# Patient Record
Sex: FEMALE | Race: WHITE | HISPANIC OR LATINO | Employment: FULL TIME | URBAN - METROPOLITAN AREA
[De-identification: names, ages, dates, MRNs, and addresses within clinical notes are randomized per-mention and may not be internally consistent; named-entity substitution may affect disease eponyms.]

---

## 2017-02-16 ENCOUNTER — TRANSFERRED RECORDS (OUTPATIENT)
Dept: HEALTH INFORMATION MANAGEMENT | Facility: CLINIC | Age: 37
End: 2017-02-16

## 2017-02-21 ENCOUNTER — TRANSFERRED RECORDS (OUTPATIENT)
Dept: HEALTH INFORMATION MANAGEMENT | Facility: CLINIC | Age: 37
End: 2017-02-21

## 2017-02-28 ENCOUNTER — TRANSFERRED RECORDS (OUTPATIENT)
Dept: HEALTH INFORMATION MANAGEMENT | Facility: CLINIC | Age: 37
End: 2017-02-28

## 2017-03-07 ENCOUNTER — TRANSFERRED RECORDS (OUTPATIENT)
Dept: HEALTH INFORMATION MANAGEMENT | Facility: CLINIC | Age: 37
End: 2017-03-07

## 2017-03-14 ENCOUNTER — TRANSFERRED RECORDS (OUTPATIENT)
Dept: HEALTH INFORMATION MANAGEMENT | Facility: CLINIC | Age: 37
End: 2017-03-14

## 2017-03-21 ENCOUNTER — TRANSFERRED RECORDS (OUTPATIENT)
Dept: HEALTH INFORMATION MANAGEMENT | Facility: CLINIC | Age: 37
End: 2017-03-21

## 2017-08-01 ENCOUNTER — TRANSFERRED RECORDS (OUTPATIENT)
Dept: HEALTH INFORMATION MANAGEMENT | Facility: CLINIC | Age: 37
End: 2017-08-01

## 2017-10-24 ENCOUNTER — TRANSFERRED RECORDS (OUTPATIENT)
Dept: HEALTH INFORMATION MANAGEMENT | Facility: CLINIC | Age: 37
End: 2017-10-24

## 2017-11-02 ENCOUNTER — PRE VISIT (OUTPATIENT)
Dept: NEUROLOGY | Facility: CLINIC | Age: 37
End: 2017-11-02

## 2017-11-02 RX ORDER — MELATONIN 10 MG
CAPSULE ORAL
COMMUNITY

## 2017-11-02 RX ORDER — SACCHAROMYCES BOULARDII 250 MG
250 CAPSULE ORAL 2 TIMES DAILY
COMMUNITY

## 2017-11-02 RX ORDER — FOLIC ACID 0.8 MG
2 TABLET ORAL DAILY
COMMUNITY

## 2017-11-02 RX ORDER — TURMERIC 95 %
POWDER (GRAM) MISCELLANEOUS
COMMUNITY

## 2017-11-02 NOTE — TELEPHONE ENCOUNTER
Left message with detailed instructions requesting pt call the clinic back to complete previsit call. Soni Berry RN

## 2017-11-02 NOTE — TELEPHONE ENCOUNTER
PREVISIT INFORMATION                                                    Edel Kramer scheduled for future visit at [facility] specialty clinics.    Patient is scheduled to see Dr. 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 Lots of tightness in neck/collarbone and shoulder, coldness/numbness in hands correlates with pain/tightness in neck/collarbone/shoulder. Lots of EMG's, all negative. Hoping to get some answers. Wondering about bulging disks or thoracic outlet syndrome.   Referring provider Dr. Kulkarni from [facility]  Has patient seen previous specialist? Yes  Medical Records:  Received.     REVIEW                                                      New patient packet mailed to patient: Yes  Medication reconciliation complete: Yes      Current Outpatient Prescriptions   Medication Sig Dispense Refill     ZOLMitriptan (ZOMIG PO) Spray 5 mg in nostril as needed for migraine       Nutritional Supplements (NUTRITIONAL SUPPLEMENT PO) Take 2 tablets by mouth 2 times daily       Probiotic Product (PROBIOTIC ADVANCED PO)        saccharomyces boulardii (FLORASTOR) 250 MG capsule Take 250 mg by mouth 2 times daily       L-GLUTAMINE PO        milk thistle extract 140 MG CAPS capsule        Ferrous Sulfate (IRON SUPPLEMENT PO)        Vitamin D-Vitamin K (VITAMIN K2-VITAMIN D3 PO) Take 10,000 Units by mouth       Multiple Vitamins-Minerals (MULTIVITAMIN ADULT PO)        Magnesium 500 MG CAPS Take 2 capsules by mouth daily       Melatonin 10 MG CAPS        Theanine 50 MG TBDP        Turmeric, Curcuma Longa, (CURCUMIN EXTRACT) POWD        Omega-3 Fatty Acids (OMEGA 3 PO)        Topiramate (TOPAMAX PO) Take 150 mg by mouth daily        Cabergoline (DOSTINEX PO) Take 0.05 mg by mouth twice a week        Sertraline HCl (ZOLOFT PO) Take 50 mg by mouth daily.       Amphetamine-Dextroamphetamine (ADDERALL PO) Take 20 mg by mouth 2  times daily.       SUMAtriptan (IMITREX) 100 MG tablet Take 100 mg by mouth at onset of headache.         Allergies: Review of patient's allergies indicates no known allergies.        PLAN/FOLLOW-UP NEEDED                                                      Previsit review complete.  Patient will see provider at future scheduled appointment.     Patient Reminders Given:  Please, make sure you bring an updated list of your medications.   If you are having a procedure, please, present 15 minutes early.  If you need to cancel or reschedule,please call [phone].    Soni Berry

## 2017-11-08 ENCOUNTER — OFFICE VISIT (OUTPATIENT)
Dept: NEUROLOGY | Facility: CLINIC | Age: 37
End: 2017-11-08
Payer: COMMERCIAL

## 2017-11-08 VITALS
OXYGEN SATURATION: 100 % | WEIGHT: 143.8 LBS | HEIGHT: 63 IN | HEART RATE: 82 BPM | TEMPERATURE: 97.9 F | BODY MASS INDEX: 25.48 KG/M2

## 2017-11-08 DIAGNOSIS — M54.2 CERVICALGIA: Primary | ICD-10-CM

## 2017-11-08 PROCEDURE — 99204 OFFICE O/P NEW MOD 45 MIN: CPT | Performed by: PSYCHIATRY & NEUROLOGY

## 2017-11-08 NOTE — NURSING NOTE
"Edel Kramer's goals for this visit include: consult  She requests these members of her care team be copied on today's visit information:     PCP: Leona Kulkarni    Referring Provider:  DO YULIYA Gal[facility] [place]  [address] [place] [address]    Chief Complaint   Patient presents with     Consult     bilateral hand nubness and cramping       Initial Pulse 82  Temp 97.9  F (36.6  C) (Oral)  Ht 1.6 m (5' 3\")  Wt 65.2 kg (143 lb 12.8 oz)  SpO2 100%  BMI 25.47 kg/m2 Estimated body mass index is 25.47 kg/(m^2) as calculated from the following:    Height as of this encounter: 1.6 m (5' 3\").    Weight as of this encounter: 65.2 kg (143 lb 12.8 oz).  Medication Reconciliation: complete    "

## 2017-11-08 NOTE — PROGRESS NOTES
HISTORY OF PRESENT ILLNESS:  Edel Kramer is a 37-year-old right-handed woman seen for evaluation of symptoms of cold hands and numb hands.  The hands are also painful.  She has a complicated history.  She says the symptoms are worse with increased use.  She is currently working in an office as a .  She has been there for the last 3-4 months.  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  If she uses the mouse or keyboard her hands will start to bother her.  If she stops activities involving her hands then the symptoms improve.  She wonders if the symptoms are coming from her neck.  If her left trapezius as tight she will have a pulling sensation in the left arm and hand and the left arm and hand will be more numb.  More recently she feels a pressure and a tightness above and below the collarbone bilaterally.  This is not present on a daily basis.  With the use of her hand, her symptoms will be numbness and a cold sensation.  She will then get shooting pains and cramping in the fingers.  It is not clear if her hand turns color such as white or blue.  She says on the ulnar aspect of the right hand it turns a slight color of blue and sometimes her hands get pale but I do not get a history that the hands actually turn colors.  When the symptoms are bad she will become anxious and have to shake her hands out.  They will be hypersensitive to touch.  If she tries to read a book her hand will go numb and she will have to keep going back and forth between the right and the left.  She has a vague tightness in her neck.  She is not exercising on a regular basis.  She was involved in physical therapy and that was helpful earlier this year but once she was discharged from therapy she did not continue with any of the exercises.  The therapy was mostly for her neck and low back.  She used to exercise on a regular basis doing roller blade, weights and ellipse but she thinks she over does it sometimes and that  makes the symptoms worse.  She has tried gabapentin but that caused side effects.  Amitriptyline was a treatment she took for migraine.  She does not have issues with regard to vision, hearing, speech or swallowing.  She does not have problems with bowel or bladder control.  She does not have symptoms in her legs.  Her sleep is fairly good.      PAST MEDICAL HISTORY:  Benign pituitary tumor.  She does not have high blood pressure, diabetes, thyroid or asthma.  She is not pregnant.  She is currently not drinking.  She does not smoke.  She has a history of a closed head injury 2 years ago.  She fainted in the bathroom and fell and hit her head and was unconscious briefly.      SOCIAL HISTORY:  She is unmarried without children.      FAMILY HISTORY:  Noncontributory.      PHYSICAL EXAMINATION:   GENERAL:  The patient is cooperative and in no distress.   VITAL SIGNS:  Her blood pressure is 100/65.   NECK:  There are no carotid bruits.   HEART:  Auscultation of the heart shows S1, S2, without murmur, rub or gallop.   CHEST:  Clear to auscultation.   MUSCULOSKELETAL:  She has fairly good cervical range of motion.   MENTAL STATUS:  Neurologic exam, the patient is alert, oriented and lucid.   CRANIAL NERVES:  Nerve testing shows full visual fields to confrontation.  Funduscopic exam shows sharp discs bilaterally.  Visual acuity is 20/20 bilaterally.  Eye movements are complete and conjugate without nystagmus.  Pupils react to light.  Facies are symmetric, and tongue protrudes in the midline.   MOTOR:  Evaluation shows no pronator drift, normal finger tapping, finger-nose-finger and heel-knee-shin.  There is no tenderness in the ulnar groove.  There is a vague Tinel sign at the right wrist.  Strength is well preserved in the arms, hands, legs and feet.  Muscle stretch reflexes are normal, reduced and symmetric.  Finger flexor reflex is present.  Toes are downgoing.   SENSORY:  Exam shows preserved vibration, temperature and  pinprick including testing in the hands.   GAIT, STATION AND COORDINATION:  Romberg sign is absent.  She can walk on her heels, toes and tandem.      She has had extensive testing for these symptoms.  She had an EMG testing in  and at the [facility] in 2016.  Both studies were normal.  In , she had MRI of the brachial plexus, cervical spine and thoracic spine.  There was a minor disk bulge at C5-C6 but no compromise of neural structures.      ASSESSMENT:     1.  Sensory disturbance and pain in the hands, arms.   2.  Cervicalgia.      DISCUSSION:  The patient is seen with the above problem list.  Her main symptoms have to do with her hands.  The etiology of her symptoms is not clear to me.  Her exam is normal. I doubt that repeat imaging and electrodiagnostic testing will be illuminating. There are some items in the history that suggest she may have Raynaud's syndrome, although the history is somewhat vague on that point.  I suggested that she wear fingerless gloves at work to keep her hands from getting cold.  For the symptoms of cervicalgia I am going to refer her back to [facility] for another course of therapy.  I told her that one of the important points on therapy is that she has to continue the exercises once she is released from the care of the physical therapist.  I will see her in followup on an as needed basis.         BUDDY BENSON MD             D: 2017 09:26   T: 2017 11:12   MT: [id number]      Name:     SKIP GANNON   MRN:      [id number]        Account:      [id number]   :      1980           Visit Date:   2017      Document: [id number]

## 2017-11-08 NOTE — LETTER
11/8/2017        RE: Edel Kramer  [address] [facility] [address]        HISTORY OF PRESENT ILLNESS:  Edel Kramer is a 37-year-old right-handed woman seen for evaluation of symptoms of cold hands and numb hands.  The hands are also painful.  She has a complicated history.  She says the symptoms are worse with increased use.  She is currently working in an office as a .  She has been there for the last 3-4 months.  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  If she uses the mouse or keyboard her hands will start to bother her.  If she stops activities involving her hands then the symptoms improve.  She wonders if the symptoms are coming from her neck.  If her left trapezius as tight she will have a pulling sensation in the left arm and hand and the left arm and hand will be more numb.  More recently she feels a pressure and a tightness above and below the collarbone bilaterally.  This is not present on a daily basis.  With the use of her hand, her symptoms will be numbness and a cold sensation.  She will then get shooting pains and cramping in the fingers.  It is not clear if her hand turns color such as white or blue.  She says on the ulnar aspect of the right hand it turns a slight color of blue and sometimes her hands get pale but I do not get a history that the hands actually turn colors.  When the symptoms are bad she will become anxious and have to shake her hands out.  They will be hypersensitive to touch.  If she tries to read a book her hand will go numb and she will have to keep going back and forth between the right and the left.  She has a vague tightness in her neck.  She is not exercising on a regular basis.  She was involved in physical therapy and that was helpful earlier this year but once she was discharged from therapy she did not continue with any of the exercises.  The therapy was mostly for her neck and low back.  She used to exercise on a  regular basis doing roller blade, weights and ellipse but she thinks she over does it sometimes and that makes the symptoms worse.  She has tried gabapentin but that caused side effects.  Amitriptyline was a treatment she took for migraine.  She does not have issues with regard to vision, hearing, speech or swallowing.  She does not have problems with bowel or bladder control.  She does not have symptoms in her legs.  Her sleep is fairly good.      PAST MEDICAL HISTORY:  Benign pituitary tumor.  She does not have high blood pressure, diabetes, thyroid or asthma.  She is not pregnant.  She is currently not drinking.  She does not smoke.  She has a history of a closed head injury 2 years ago.  She fainted in the bathroom and fell and hit her head and was unconscious briefly.      SOCIAL HISTORY:  She is unmarried without children.      FAMILY HISTORY:  Noncontributory.      PHYSICAL EXAMINATION:   GENERAL:  The patient is cooperative and in no distress.   VITAL SIGNS:  Her blood pressure is 100/65.   NECK:  There are no carotid bruits.   HEART:  Auscultation of the heart shows S1, S2, without murmur, rub or gallop.   CHEST:  Clear to auscultation.   MUSCULOSKELETAL:  She has fairly good cervical range of motion.   MENTAL STATUS:  Neurologic exam, the patient is alert, oriented and lucid.   CRANIAL NERVES:  Nerve testing shows full visual fields to confrontation.  Funduscopic exam shows sharp discs bilaterally.  Visual acuity is 20/20 bilaterally.  Eye movements are complete and conjugate without nystagmus.  Pupils react to light.  Facies are symmetric, and tongue protrudes in the midline.   MOTOR:  Evaluation shows no pronator drift, normal finger tapping, finger-nose-finger and heel-knee-shin.  There is no tenderness in the ulnar groove.  There is a vague Tinel sign at the right wrist.  Strength is well preserved in the arms, hands, legs and feet.  Muscle stretch reflexes are normal, reduced and symmetric.  Finger  flexor reflex is present.  Toes are downgoing.   SENSORY:  Exam shows preserved vibration, temperature and pinprick including testing in the hands.   GAIT, STATION AND COORDINATION:  Romberg sign is absent.  She can walk on her heels, toes and tandem.      She has had extensive testing for these symptoms.  She had an EMG testing in  and at the [facility] in 2016.  Both studies were normal.  In , she had MRI of the brachial plexus, cervical spine and thoracic spine.  There was a minor disk bulge at C5-C6 but no compromise of neural structures.      ASSESSMENT:     1.  Sensory disturbance and pain in the hands, arms.   2.  Cervicalgia.      DISCUSSION:  The patient is seen with the above problem list.  Her main symptoms have to do with her hands.  The etiology of her symptoms is not clear to me.  Her exam is normal. I doubt that repeat imaging and electrodiagnostic testing will be illuminating. There are some items in the history that suggest she may have Raynaud's syndrome, although the history is somewhat vague on that point.  I suggested that she wear fingerless gloves at work to keep her hands from getting cold.  For the symptoms of cervicalgia I am going to refer her back to [facility] for another course of therapy.  I told her that one of the important points on therapy is that she has to continue the exercises once she is released from the care of the physical therapist.  I will see her in followup on an as needed basis.         BUDDY BENSON MD             D: 2017 09:26   T: 2017 11:12   MT: [id number]      Name:     SKIP GANNON   MRN:      [id number]        Account:      [id number]   :      1980           Visit Date:   2017      Document: [id number]          Sincerely,        Buddy Benson MD

## 2018-01-25 ENCOUNTER — HOSPITAL ENCOUNTER (EMERGENCY)
Facility: CLINIC | Age: 38
Discharge: HOME | End: 2018-01-25
Attending: EMERGENCY MEDICINE | Admitting: EMERGENCY MEDICINE
Payer: COMMERCIAL

## 2018-01-25 VITALS
RESPIRATION RATE: 16 BRPM | TEMPERATURE: 97.9 F | OXYGEN SATURATION: 100 % | DIASTOLIC BLOOD PRESSURE: 68 MMHG | HEIGHT: 63 IN | BODY MASS INDEX: 24.8 KG/M2 | WEIGHT: 140 LBS | SYSTOLIC BLOOD PRESSURE: 120 MMHG | HEART RATE: 78 BPM
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
## 2018-01-25 DIAGNOSIS — M79.642 PAIN OF LEFT HAND: ICD-10-CM

## 2018-01-25 DIAGNOSIS — M79.645 THUMB PAIN, LEFT: ICD-10-CM

## 2018-01-25 PROCEDURE — 99282 EMERGENCY DEPT VISIT SF MDM: CPT

## 2018-01-25 ASSESSMENT — ENCOUNTER SYMPTOMS
NUMBNESS: 1
FEVER: 0

## 2018-01-25 NOTE — ED AVS SNAPSHOT
Emergency Department    [address] [place] [id number]    Phone:  [phone]    Fax:  [phone]                                       Edel Kramer   MRN: [id number]    Department:   Emergency Department   Date of Visit:  1/25/2018           After Visit Summary Signature Page     I have received my discharge instructions, and my questions have been answered. I have discussed any challenges I see with this plan with the nurse or doctor.    ..........................................................................................................................................  Patient/Patient Representative Signature      ..........................................................................................................................................  Patient Representative Print Name and Relationship to Patient    ..................................................               ................................................  Date                                            Time    ..........................................................................................................................................  Reviewed by Signature/Title    ...................................................              ..............................................  Date                                                            Time

## 2018-01-25 NOTE — ED AVS SNAPSHOT
Emergency Department    [address] [place] [address]    Phone:  [phone]    Fax:  [phone]                                       Edel Kramer   MRN: [id number]    Department:   Emergency Department   Date of Visit:  1/25/2018           Patient Information     Date Of Birth          1980        Your diagnoses for this visit were:     Pain of left hand     Thumb pain, left     Dog bite, initial encounter        You were seen by Ovi Shepherd MD.      Follow-up Information     Please follow up.    Why:  follow up with hand surgeon tomorrow        Discharge Instructions         Animal Bite (General)  An animal bite can cause a wound deep enough to break the skin. In such cases, the wound is cleaned and then sometimes closed. If the wound is closed, it may not be closed completely. This is so that fluid can drain if the wound becomes infected. In addition to wound care, a tetanus shot may be given, if needed.    Home care    Care for the wound as directed. If a dressing was applied to the wound, be sure to change it as directed.    Wash your hands well with soap and warm water before and after caring for the wound. This helps lower the risk of infection.    If the wound bleeds, place a clean, soft cloth on the wound. Then firmly apply pressure until the bleeding stops. This may take up to 5 minutes. Do not release the pressure and look at the wound during this time.    Most skin wounds heal within 10 days. But an infection can occur even with proper treatment. So be sure to watch the wound for signs of infection (see below). Check the wound as often as directed by your healthcare provider.    Antibiotics may be prescribed. These help prevent or treat infection. If you re given antibiotics, take them as directed. Also be sure to complete the medicines.  Rabies prevention  Rabies is a virus that can be carried in certain animals. These can include domestic animals such as  dogs and cats. Wild animals such as skunks, raccoons, foxes, and bats can also carry rabies. Pets fully vaccinated against rabies (2 shots) are at very low risk of infection. But because human rabies is almost always fatal, any biting pet should be confined for 10 days as an extra precaution. In general, if there is a risk for rabies, the following steps may need to be taken:    If someone s pet dog or cat has bitten you, it should be kept in a secure area for the next 10 days to watch for signs of illness. (If the pet owner won t allow this, contact your local animal control center.) If the dog or cat becomes ill or dies during that time, contact your local animal control center at once so the animal may be tested for rabies. If the pet stays healthy for the next 10 days, there is no danger of rabies in the animal or you.    If a stray pet bit you, contact your local animal control center. They can give information on capture, quarantine, and animal rabies testing.    If you can t find the animal that bit you in the next 2 days, and if rabies exists in your region, you may need to receive the rabies vaccine series. Call your healthcare provider right away. Or return to the emergency department promptly.    All animal bites should be reported to the local animal control center. If you were not given a form to fill out, you can report this yourself.  Follow-up care  Follow up with your healthcare provider, or as directed.  When to seek medical advice  Call your healthcare provider right away if any of these occur:    Signs of infection:    Spreading redness or warmth from the wound    Increased pain or swelling    Fever of 100.4 F (38 C) or higher, or as directed by your healthcare provider    Colored fluid or pus draining from the wound    Signs of rabies infection:    Headache    Confusion    Strange behavior    Increased salivating and drooling    Seizure    Decreased ability to move any body part near the bite  area    Bleeding that can't be stopped after 5 minutes of firm pressure  Date Last Reviewed: 3/1/2017    9078-7780 [employer]. [address] [place] [address], [place] [address]. All rights reserved. This information is not intended as a substitute for professional medical care. Always follow your healthcare professional's instructions.          24 Hour Appointment Hotline       To make an appointment at any [facility], call [phone] ([phone]). If you don't have a family doctor or clinic, we will help you find one. [facility] are conveniently located to serve the needs of you and your family.             Review of your medicines      Our records show that you are taking the medicines listed below. If these are incorrect, please call your family doctor or clinic.        Dose / Directions Last dose taken    ADDERALL PO   Dose:  20 mg        Take 20 mg by mouth 2 times daily.   Refills:  0        Curcumin Extract Powd        Refills:  0        DOSTINEX PO   Dose:  0.05 mg        Take 0.05 mg by mouth twice a week   Refills:  0        IMITREX 100 MG tablet   Dose:  100 mg   Generic drug:  SUMAtriptan        Take 100 mg by mouth at onset of headache.   Refills:  0        IRON SUPPLEMENT PO        Refills:  0        L-GLUTAMINE PO        Refills:  0        Magnesium 500 MG Caps   Dose:  2 capsule        Take 2 capsules by mouth daily   Refills:  0        Melatonin 10 MG Caps        Refills:  0        milk thistle extract 140 MG Caps capsule        Refills:  0        MULTIVITAMIN ADULT PO        Refills:  0        NUTRITIONAL SUPPLEMENT PO   Dose:  2 tablet   Indication:  candibactin AR/BR        Take 2 tablets by mouth 2 times daily   Refills:  0        OMEGA 3 PO        Refills:  0        PROBIOTIC ADVANCED PO        Refills:  0        saccharomyces boulardii 250 MG capsule   Commonly known as:  FLORASTOR   Dose:  250 mg        Take 250 mg by mouth 2 times daily   Refills:  0        Theanine  50 MG Tbdp        Refills:  0        TOPAMAX PO   Dose:  150 mg        Take 150 mg by mouth daily   Refills:  0        VITAMIN K2-VITAMIN D3 PO   Dose:  09683 Units        Take 10,000 Units by mouth   Refills:  0        ZOLOFT PO   Dose:  50 mg        Take 50 mg by mouth daily.   Refills:  0        ZOMIG PO   Dose:  5 mg        Spray 5 mg in nostril as needed for migraine   Refills:  0                Orders Needing Specimen Collection     None      Pending Results     No orders found from 1/23/2018 to 1/26/2018.            Pending Culture Results     No orders found from 1/23/2018 to 1/26/2018.            Pending Results Instructions     If you had any lab results that were not finalized at the time of your Discharge, you can call the ED Lab Result RN at [phone]. You will be contacted by this team for any positive Lab results or changes in treatment. The nurses are available 7 days a week from 10A to 6:30P.  You can leave a message 24 hours per day and they will return your call.        Test Results From Your Hospital Stay               Clinical Quality Measure: Blood Pressure Screening     Your blood pressure was checked while you were in the emergency department today. The last reading we obtained was  BP: 120/68 . Please read the guidelines below about what these numbers mean and what you should do about them.  If your systolic blood pressure (the top number) is less than 120 and your diastolic blood pressure (the bottom number) is less than 80, then your blood pressure is normal. There is nothing more that you need to do about it.  If your systolic blood pressure (the top number) is 120-139 or your diastolic blood pressure (the bottom number) is 80-89, your blood pressure may be higher than it should be. You should have your blood pressure rechecked within a year by a primary care provider.  If your systolic blood pressure (the top number) is 140 or greater or your diastolic blood pressure (the bottom  "number) is 90 or greater, you may have high blood pressure. High blood pressure is treatable, but if left untreated over time it can put you at risk for heart attack, stroke, or kidney failure. You should have your blood pressure rechecked by a primary care provider within the next 4 weeks.  If your provider in the emergency department today gave you specific instructions to follow-up with your doctor or provider even sooner than that, you should follow that instruction and not wait for up to 4 weeks for your follow-up visit.        Thank you for choosing [facility]       Thank you for choosing [facility] for your care. Our goal is always to provide you with excellent care. Hearing back from our patients is one way we can continue to improve our services. Please take a few minutes to complete the written survey that you may receive in the mail after you visit with us. Thank you!        [facility] [employer] Information     [employer] lets you send messages to your doctor, view your test results, renew your prescriptions, schedule appointments and more. To sign up, go to [email or web address] . Click on \"Log in\" on the left side of the screen, which will take you to the Welcome page. Then click on \"Sign up Now\" on the right side of the page.     You will be asked to enter the access code listed below, as well as some personal information. Please follow the directions to create your username and password.     Your access code is: [email or web address]  Expires: 2018  9:15 AM     Your access code will  in 90 days. If you need help or a new code, please call your [facility] clinic or [phone].        Care EveryWhere ID     This is your Care EveryWhere ID. This could be used by other organizations to access your [facility] medical records  [id number]        Equal Access to Services     SARAHY LÓPEZ : lele Dodge qaybta kaalmada adeegyada, waxay idiin hayaan adeeg kharash la'aan ah. So moe " [phone].    ATENCIÓN: Si habla español, tiene a carrero disposición servicios gratuitos de asistencia lingüística. Llame al [phone].    We comply with applicable federal civil rights laws and [place] laws. We do not discriminate on the basis of race, color, national origin, age, disability, sex, sexual orientation, or gender identity.            After Visit Summary       This is your record. Keep this with you and show to your community pharmacist(s) and doctor(s) at your next visit.
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
## 2020-04-03 ENCOUNTER — VIRTUAL VISIT (OUTPATIENT)
Dept: INTERNAL MEDICINE | Facility: CLINIC | Age: 40
End: 2020-04-03
Payer: COMMERCIAL

## 2020-04-03 DIAGNOSIS — M25.512 CHRONIC PAIN OF BOTH SHOULDERS: ICD-10-CM

## 2020-04-03 DIAGNOSIS — G89.29 CHRONIC PAIN OF BOTH SHOULDERS: ICD-10-CM

## 2020-04-03 DIAGNOSIS — M25.511 CHRONIC PAIN OF BOTH SHOULDERS: ICD-10-CM

## 2020-04-03 DIAGNOSIS — M25.512 ACUTE PAIN OF LEFT SHOULDER: Primary | ICD-10-CM

## 2020-04-03 PROCEDURE — 99204 OFFICE O/P NEW MOD 45 MIN: CPT | Mod: TEL | Performed by: INTERNAL MEDICINE

## 2020-04-03 RX ORDER — METHOCARBAMOL 500 MG/1
500 TABLET, FILM COATED ORAL 3 TIMES DAILY PRN
COMMUNITY
Start: 2020-03-31 | End: 2020-04-03

## 2020-04-03 RX ORDER — METHOCARBAMOL 500 MG/1
500 TABLET, FILM COATED ORAL 3 TIMES DAILY PRN
Qty: 30 TABLET | Refills: 0 | Status: SHIPPED | OUTPATIENT
Start: 2020-04-03 | End: 2020-04-13

## 2020-04-03 RX ORDER — OXYCODONE AND ACETAMINOPHEN 5; 325 MG/1; MG/1
1 TABLET ORAL EVERY 12 HOURS PRN
Qty: 20 TABLET | Refills: 0 | Status: SHIPPED | OUTPATIENT
Start: 2020-04-03 | End: 2020-04-13

## 2020-04-03 RX ORDER — OXYCODONE AND ACETAMINOPHEN 5; 325 MG/1; MG/1
1 TABLET ORAL EVERY 4 HOURS PRN
COMMUNITY
Start: 2020-03-31 | End: 2020-04-03

## 2020-04-03 ASSESSMENT — ENCOUNTER SYMPTOMS
ARTHRALGIAS: 1
WEAKNESS: 1
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
## 2020-04-03 NOTE — NURSING NOTE
"Chief Complaint   Patient presents with     Shoulder     pt was seen in ED and was told to get MRI..pt c/o left shoulder pain and also had several injuries to it as well.     initial There were no vitals taken for this visit. Estimated body mass index is 24.8 kg/m  as calculated from the following:    Height as of 1/25/18: 1.6 m (5' 3\").    Weight as of 1/25/18: 63.5 kg (140 lb)..  bp completed using cuff size NA (Not Taken)  FIDELINA MEDINA LPN  "

## 2020-11-13 ENCOUNTER — APPOINTMENT (OUTPATIENT)
Dept: CT IMAGING | Facility: CLINIC | Age: 40
End: 2020-11-13
Attending: PHYSICIAN ASSISTANT
Payer: COMMERCIAL

## 2020-11-13 ENCOUNTER — APPOINTMENT (OUTPATIENT)
Dept: ULTRASOUND IMAGING | Facility: CLINIC | Age: 40
End: 2020-11-13
Attending: PHYSICIAN ASSISTANT
Payer: COMMERCIAL

## 2020-11-13 ENCOUNTER — HOSPITAL ENCOUNTER (EMERGENCY)
Facility: CLINIC | Age: 40
Discharge: HOME | End: 2020-11-13
Attending: PHYSICIAN ASSISTANT | Admitting: PHYSICIAN ASSISTANT
Payer: COMMERCIAL

## 2020-11-13 ENCOUNTER — NURSE TRIAGE (OUTPATIENT)
Dept: NURSING | Facility: CLINIC | Age: 40
End: 2020-11-13

## 2020-11-13 VITALS
OXYGEN SATURATION: 100 % | SYSTOLIC BLOOD PRESSURE: 111 MMHG | RESPIRATION RATE: 16 BRPM | WEIGHT: 155 LBS | HEART RATE: 68 BPM | BODY MASS INDEX: 27.46 KG/M2 | DIASTOLIC BLOOD PRESSURE: 61 MMHG

## 2020-11-13 DIAGNOSIS — K92.1 HEMATOCHEZIA: ICD-10-CM

## 2020-11-13 DIAGNOSIS — R10.84 ABDOMINAL PAIN, GENERALIZED: ICD-10-CM

## 2020-11-13 DIAGNOSIS — R19.7 DIARRHEA: ICD-10-CM

## 2020-11-13 DIAGNOSIS — K52.9 COLITIS: ICD-10-CM

## 2020-11-13 LAB
ALBUMIN SERPL-MCNC: 3.4 G/DL (ref 3.4–5)
ALBUMIN UR-MCNC: NEGATIVE MG/DL
ALP SERPL-CCNC: 78 U/L (ref 40–150)
ALT SERPL W P-5'-P-CCNC: 22 U/L (ref 0–50)
ANION GAP SERPL CALCULATED.3IONS-SCNC: 8 MMOL/L (ref 3–14)
APPEARANCE UR: CLEAR
AST SERPL W P-5'-P-CCNC: 16 U/L (ref 0–45)
BASOPHILS # BLD AUTO: 0 10E9/L (ref 0–0.2)
BASOPHILS NFR BLD AUTO: 0.4 %
BILIRUB SERPL-MCNC: 0.2 MG/DL (ref 0.2–1.3)
BILIRUB UR QL STRIP: NEGATIVE
BUN SERPL-MCNC: 11 MG/DL (ref 7–30)
CALCIUM SERPL-MCNC: 8.4 MG/DL (ref 8.5–10.1)
CHLORIDE SERPL-SCNC: 106 MMOL/L (ref 94–109)
CO2 SERPL-SCNC: 22 MMOL/L (ref 20–32)
COLOR UR AUTO: YELLOW
CREAT SERPL-MCNC: 0.81 MG/DL (ref 0.52–1.04)
DIFFERENTIAL METHOD BLD: ABNORMAL
EOSINOPHIL # BLD AUTO: 0 10E9/L (ref 0–0.7)
EOSINOPHIL NFR BLD AUTO: 0.8 %
ERYTHROCYTE [DISTWIDTH] IN BLOOD BY AUTOMATED COUNT: 15.8 % (ref 10–15)
GFR SERPL CREATININE-BSD FRML MDRD: >90 ML/MIN/{1.73_M2}
GLUCOSE SERPL-MCNC: 99 MG/DL (ref 70–99)
GLUCOSE UR STRIP-MCNC: NEGATIVE MG/DL
HCG UR QL: NEGATIVE
HCT VFR BLD AUTO: 38.8 % (ref 35–47)
HGB BLD-MCNC: 11.9 G/DL (ref 11.7–15.7)
HGB UR QL STRIP: NEGATIVE
IMM GRANULOCYTES # BLD: 0 10E9/L (ref 0–0.4)
IMM GRANULOCYTES NFR BLD: 0.4 %
KETONES UR STRIP-MCNC: >150 MG/DL
LACTATE BLD-SCNC: 0.7 MMOL/L (ref 0.7–2)
LEUKOCYTE ESTERASE UR QL STRIP: NEGATIVE
LIPASE SERPL-CCNC: 126 U/L (ref 73–393)
LYMPHOCYTES # BLD AUTO: 1.3 10E9/L (ref 0.8–5.3)
LYMPHOCYTES NFR BLD AUTO: 24.8 %
MCH RBC QN AUTO: 26.8 PG (ref 26.5–33)
MCHC RBC AUTO-ENTMCNC: 30.7 G/DL (ref 31.5–36.5)
MCV RBC AUTO: 87 FL (ref 78–100)
MONOCYTES # BLD AUTO: 0.6 10E9/L (ref 0–1.3)
MONOCYTES NFR BLD AUTO: 11.7 %
MUCOUS THREADS #/AREA URNS LPF: PRESENT /LPF
NEUTROPHILS # BLD AUTO: 3.3 10E9/L (ref 1.6–8.3)
NEUTROPHILS NFR BLD AUTO: 61.9 %
NITRATE UR QL: NEGATIVE
NRBC # BLD AUTO: 0 10*3/UL
NRBC BLD AUTO-RTO: 0 /100
PH UR STRIP: 6 PH (ref 5–7)
PLATELET # BLD AUTO: 276 10E9/L (ref 150–450)
POTASSIUM SERPL-SCNC: 3.7 MMOL/L (ref 3.4–5.3)
PROT SERPL-MCNC: 7.3 G/DL (ref 6.8–8.8)
RBC # BLD AUTO: 4.44 10E12/L (ref 3.8–5.2)
RBC #/AREA URNS AUTO: 1 /HPF (ref 0–2)
SODIUM SERPL-SCNC: 136 MMOL/L (ref 133–144)
SOURCE: ABNORMAL
SP GR UR STRIP: 1.02 (ref 1–1.03)
SQUAMOUS #/AREA URNS AUTO: 1 /HPF (ref 0–1)
UROBILINOGEN UR STRIP-MCNC: NORMAL MG/DL (ref 0–2)
WBC # BLD AUTO: 5.3 10E9/L (ref 4–11)
WBC #/AREA URNS AUTO: 9 /HPF (ref 0–5)

## 2020-11-13 PROCEDURE — 83605 ASSAY OF LACTIC ACID: CPT | Performed by: PHYSICIAN ASSISTANT

## 2020-11-13 PROCEDURE — 85025 COMPLETE CBC W/AUTO DIFF WBC: CPT | Performed by: PHYSICIAN ASSISTANT

## 2020-11-13 PROCEDURE — 99285 EMERGENCY DEPT VISIT HI MDM: CPT | Mod: 25

## 2020-11-13 PROCEDURE — 80053 COMPREHEN METABOLIC PANEL: CPT | Performed by: PHYSICIAN ASSISTANT

## 2020-11-13 PROCEDURE — 96375 TX/PRO/DX INJ NEW DRUG ADDON: CPT

## 2020-11-13 PROCEDURE — 93976 VASCULAR STUDY: CPT

## 2020-11-13 PROCEDURE — 87506 IADNA-DNA/RNA PROBE TQ 6-11: CPT | Performed by: PHYSICIAN ASSISTANT

## 2020-11-13 PROCEDURE — 96361 HYDRATE IV INFUSION ADD-ON: CPT

## 2020-11-13 PROCEDURE — 258N000003 HC RX IP 258 OP 636: Performed by: PHYSICIAN ASSISTANT

## 2020-11-13 PROCEDURE — 250N000011 HC RX IP 250 OP 636: Performed by: PHYSICIAN ASSISTANT

## 2020-11-13 PROCEDURE — 74177 CT ABD & PELVIS W/CONTRAST: CPT

## 2020-11-13 PROCEDURE — 81025 URINE PREGNANCY TEST: CPT | Performed by: PHYSICIAN ASSISTANT

## 2020-11-13 PROCEDURE — 96374 THER/PROPH/DIAG INJ IV PUSH: CPT | Mod: 59

## 2020-11-13 PROCEDURE — 83690 ASSAY OF LIPASE: CPT | Performed by: PHYSICIAN ASSISTANT

## 2020-11-13 PROCEDURE — 87493 C DIFF AMPLIFIED PROBE: CPT | Performed by: PHYSICIAN ASSISTANT

## 2020-11-13 PROCEDURE — 81001 URINALYSIS AUTO W/SCOPE: CPT | Performed by: PHYSICIAN ASSISTANT

## 2020-11-13 RX ORDER — TRAMADOL HYDROCHLORIDE 50 MG/1
50 TABLET ORAL EVERY 6 HOURS PRN
Qty: 5 TABLET | Refills: 0 | Status: SHIPPED | OUTPATIENT
Start: 2020-11-13 | End: 2020-11-16

## 2020-11-13 RX ORDER — ONDANSETRON 2 MG/ML
4 INJECTION INTRAMUSCULAR; INTRAVENOUS ONCE
Status: COMPLETED | OUTPATIENT
Start: 2020-11-13 | End: 2020-11-13

## 2020-11-13 RX ORDER — SODIUM CHLORIDE 9 MG/ML
INJECTION, SOLUTION INTRAVENOUS CONTINUOUS
Status: DISCONTINUED | OUTPATIENT
Start: 2020-11-13 | End: 2020-11-13 | Stop reason: HOSPADM

## 2020-11-13 RX ORDER — ONDANSETRON 4 MG/1
4 TABLET, ORALLY DISINTEGRATING ORAL EVERY 8 HOURS PRN
Qty: 10 TABLET | Refills: 0 | Status: SHIPPED | OUTPATIENT
Start: 2020-11-13 | End: 2020-11-16

## 2020-11-13 RX ORDER — HYDROMORPHONE HYDROCHLORIDE 1 MG/ML
0.5 INJECTION, SOLUTION INTRAMUSCULAR; INTRAVENOUS; SUBCUTANEOUS
Status: COMPLETED | OUTPATIENT
Start: 2020-11-13 | End: 2020-11-13

## 2020-11-13 RX ORDER — IOPAMIDOL 755 MG/ML
500 INJECTION, SOLUTION INTRAVASCULAR ONCE
Status: COMPLETED | OUTPATIENT
Start: 2020-11-13 | End: 2020-11-13

## 2020-11-13 RX ORDER — ACETAMINOPHEN 500 MG
1000 TABLET ORAL ONCE
Status: DISCONTINUED | OUTPATIENT
Start: 2020-11-13 | End: 2020-11-13 | Stop reason: HOSPADM

## 2020-11-13 RX ADMIN — IOPAMIDOL 78 ML: 755 INJECTION, SOLUTION INTRAVENOUS at 20:35

## 2020-11-13 RX ADMIN — SODIUM CHLORIDE: 9 INJECTION, SOLUTION INTRAVENOUS at 19:47

## 2020-11-13 RX ADMIN — ONDANSETRON 4 MG: 2 INJECTION INTRAMUSCULAR; INTRAVENOUS at 19:47

## 2020-11-13 RX ADMIN — SODIUM CHLORIDE 1000 ML: 9 INJECTION, SOLUTION INTRAVENOUS at 17:12

## 2020-11-13 RX ADMIN — HYDROMORPHONE HYDROCHLORIDE 0.5 MG: 1 INJECTION, SOLUTION INTRAMUSCULAR; INTRAVENOUS; SUBCUTANEOUS at 20:10

## 2020-11-13 ASSESSMENT — ENCOUNTER SYMPTOMS
ABDOMINAL PAIN: 1
BLOOD IN STOOL: 1
DIARRHEA: 1

## 2020-11-13 NOTE — ED AVS SNAPSHOT
[facility]  [address]  [facility] [address]  Phone: [phone]  Fax: [phone]                                    Edel Kramer   MRN: [id number]    Department: [facility] Emergency [facility]   Date of Visit: 11/13/2020           After Visit Summary Signature Page    I have received my discharge instructions, and my questions have been answered. I have discussed any challenges I see with this plan with the nurse or doctor.    ..........................................................................................................................................  Patient/Patient Representative Signature      ..........................................................................................................................................  Patient Representative Print Name and Relationship to Patient    ..................................................               ................................................  Date                                   Time    ..........................................................................................................................................  Reviewed by Signature/Title    ...................................................              ..............................................  Date                                               Time          22EPIC Rev 08/18

## 2020-11-13 NOTE — ED PROVIDER NOTES
History     Chief Complaint:  Diarrhea       HPI   Edel Kramer is a 40 year old female who presents to the ED for evaluation of diarrhea.  The patient reports onset of diarrhea that woke her up from sleep around 0400 today.  She states that during initial episode, she had to lay down the toilet she felt that she had a near syncopal episode.  She did not experience syncope or hit her head.  She states that since onset she has had approximately 10 episodes, however fortunately no episodes in the last hour.  She reports associated nausea without vomiting.  She does note cramping to her bilateral lower abdomen as well as some right-sided flank pain that began approximately 5 hours ago.  She does note a small amount of blood present in her diarrhea as well.  No fevers, chills, chest pain, dyspnea.  No urinary symptoms.  Patient states she did take tramadol for pain last night.  She is additionally on metronidazole vaginal gel for BV. Of note, patient was seen in the ED twice within the past 10 days for RLQ abdominal pain. See MDMs below.    MDM 11/10/20  40-year-old female presents with right-sided abdominal and pelvic pain moving to her back and shoulder. She was recently diagnosed with a 3 cm right ovarian cyst. Follow-up today with gynecology was reassuring however her symptoms worsen while at work this evening. I suspect the cyst ruptured and is causing mild generalized peritoneal irritation. She was given IV fluids with pain control. Labs reveal a mild drop in her hemoglobin consistent with a hemorrhagic cyst. She has normal vital signs and no findings suggestive of active bleeding. Ultrasound reveals the cyst has decreased to 1.5 cm without significant free fluid. The patient is stable for discharge. We discussed follow-up if not improved over the next 2 to 3 days. Gynecology recommended interval ultrasound in approximately 6 weeks. She will be prescribed limited number of tramadol for breakthrough pain and  continue using NSAIDs and Zofran as needed.    [facility] 11/3/20  I accepted this patient in signout from Dr. Sanchez. In brief, the patient is a 40-year-old female who presented for evaluation of right-sided abdominal pain. At the time of signout, the patient was awaiting results of a CT of her abdomen and pelvis with a plan to potentially obtain a pelvic ultrasound. CT imaging was obtained which does reveal a right-sided ovarian cyst. Ultrasound was subsequently obtained to further evaluate this and to exclude torsion. Normal blood flow was noted to the ovary. On reevaluation, the patient continues to have pain. She reports that she was having this pain during the ultrasound. We discussed options for management. I did offer continued emergency department observation and IV pain control with reevaluation versus oral pain control and discharged home. Patient would prefer to try to go home. We discussed the possibility of intermittent torsion although I think this is less likely given that she did have pain during her ultrasound. She agrees to return if symptoms worsen. Otherwise, I recommended close outpatient follow-up with her OB/GYN. Patient expressed comfort with this plan. She appears nontoxic at the time of discharge.    Allergies:  Droperidol  Benzene     Medications:         Amphetamine-Dextroamphetamine (ADDERALL PO)       Cabergoline (DOSTINEX PO)       Ferrous Sulfate (IRON SUPPLEMENT PO)       L-GLUTAMINE PO       Magnesium 500 MG CAPS       Melatonin 10 MG CAPS       milk thistle extract 140 MG CAPS capsule       Multiple Vitamins-Minerals (MULTIVITAMIN ADULT PO)       Nutritional Supplements (NUTRITIONAL SUPPLEMENT PO)       Omega-3 Fatty Acids (OMEGA 3 PO)       Probiotic Product (PROBIOTIC ADVANCED PO)       saccharomyces boulardii (FLORASTOR) 250 MG capsule       Sertraline HCl (ZOLOFT PO)       SUMAtriptan (IMITREX) 100 MG tablet       Theanine 50 MG TBDP       Topiramate (TOPAMAX PO)       Turmeric,  Curcuma Longa, (CURCUMIN EXTRACT) POWD       Vitamin D-Vitamin K (VITAMIN K2-VITAMIN D3 PO)       ZOLMitriptan (ZOMIG PO)        Past Medical History:    Past Medical History:   Diagnosis Date     Anxiety      Depressive disorder      Migraine      Substance abuse            Past Surgical History:    Liposuction     Family History:    Reviewed, no pertinent past family history.    Social History:   reports that she has quit smoking. She does not have any smokeless tobacco history on file. She reports current alcohol use. She reports that she does not use drugs.    PCP: No Ref-Primary, Physician     Review of Systems   Gastrointestinal: Positive for abdominal pain, blood in stool and diarrhea.   Neurological: Positive for syncope.   All other systems reviewed and are negative.        Physical Exam     Patient Vitals for the past 24 hrs:   BP Pulse Resp SpO2 Weight   11/13/20 1954 111/61 -- -- 100 % --   11/13/20 1730 122/70 68 -- 100 % --   11/13/20 1717 115/68 -- -- 100 % --   11/13/20 1502 106/85 86 16 100 % 70.3 kg (155 lb)        Physical Exam  Constitutional: Pleasant. Cooperative.   Eyes: Pupils equally round and reactive  HENT: Head is normal in appearance. Oropharynx is normal with moist mucus membranes.  Cardiovascular: Regular rate and rhythm and without murmurs.  Respiratory: Normal respiratory effort, lungs are clear bilaterally.  GI: Mild TTP of bilateral lower abdomen. Otherwise soft, non-distended. No guarding, rebound, or rigidity.  Musculoskeletal: No asymmetry of the lower extremities, no tenderness to palpation. No CVA TTP.  Skin: Normal, without rash.  Neurologic: Cranial nerves grossly intact, normal cognition, no focal deficits. Alert and oriented x 3.   Psychiatric: Normal affect.  Rectal: Two non-thrombosed hemorrhoids noted. No fissures. No bright red blood noted.  Nursing notes and vital signs reviewed.    Emergency Department Course     Imaging:  US Pelvis Cmplt w Transvag & Doppler LmtPel  Duplex Limited   Final Result   IMPRESSION:     1.  Left ovary is not seen due to overlying bowel gas. Exam is otherwise unremarkable.               CT Abdomen Pelvis w Contrast    (Results Pending)        Laboratory:  Labs Ordered and Resulted from Time of ED Arrival Up to the Time of Departure from the ED   CBC WITH PLATELETS DIFFERENTIAL - Abnormal; Notable for the following components:       Result Value    MCHC 30.7 (*)     RDW 15.8 (*)     All other components within normal limits   COMPREHENSIVE METABOLIC PANEL - Abnormal; Notable for the following components:    Calcium 8.4 (*)     All other components within normal limits   ROUTINE UA WITH MICROSCOPIC REFLEX TO CULTURE - Abnormal; Notable for the following components:    Ketones Urine >150 (*)     WBC Urine 9 (*)     Mucous Urine Present (*)     All other components within normal limits   LIPASE   LACTIC ACID WHOLE BLOOD   HCG QUALITATIVE URINE   ENTERIC BACTERIA AND VIRUS PANEL BY SHELDON STOOL   CLOSTRIDIUM DIFFICILE TOXIN B      Interventions:  Medications   0.9% sodium chloride BOLUS (0 mLs Intravenous Stopped 11/13/20 1840)     Followed by   sodium chloride 0.9% infusion ( Intravenous New Bag 11/13/20 1947)   acetaminophen (TYLENOL) tablet 1,000 mg (1,000 mg Oral Not Given 11/13/20 1920)   ondansetron (ZOFRAN) injection 4 mg (4 mg Intravenous Given 11/13/20 1947)   HYDROmorphone (PF) (DILAUDID) injection 0.5 mg (0.5 mg Intravenous Given 11/13/20 2010)        Emergency Department Course:  Past medical records, nursing notes, and vitals reviewed.  I performed an exam of the patient and obtained history, as documented above.  I Ordered the above labs and US.  Discussed results with patient.  I ordered CT of abdomen.  Patient signed out to Dr. Hernandez.    Impression & Plan      Medical Decision Making:  Edel Kramer is a 40 year old female who presents to the ED for evaluation of diarrhea and abdominal cramping.  Patient has had ongoing right-sided  abdominal pain for the past 2 weeks.  She has had two work-ups in the emergency departments since onset with CT of abdomen revealing a right-sided ovarian cyst, likely ruptured, as cause of her pain. Today she developed profuse diarrhea and noted blood in the diarrhea this afternoon, prompting her presentation to the ED. She also noted an episode of R flank pain. See HPI as above for additional details. Vitals and physical exam as above. DDx for RLQ/R flank pain was broad and included mesenteric ischemia, diverticulitis, perforated viscous, pyelonephritis, stone, appendicitis, ovarian torsion, ovarian cyst, among others. Given known cyst, concern for ovarian torsion, so US obtained without evidence for nefarious etiology. DDx of patient's hematochezia included ishemic colitis, infectious colitis, diverticulosis, among others. Suspect infectious colitis at this time given associated diarrhea. Patient does have known history of diverticula on CT obtained 10 days ago at [place]. On reevaluation, patient notes multiple additional episodes of hematochezia and worsening abdominal pain, so CT of Abd/pelvis added on. This is pending at time of this dictation. Patient signed out to Dr. Hernandez for ultimate disposition pending CT. With negative CT, plan for discharge to home with conservative management, including Tylenol, pushing fluids with electrolytes. Close follow up with PCP will be indicated with persistent diarrhea. Stool studies pending at this time.     Diagnosis:    ICD-10-CM    1. Diarrhea  R19.7    2. Hematochezia  K92.1    3. Abdominal pain, generalized  R10.84         Discharge Medications:     Medication List      Started    ondansetron 4 MG ODT tab  Commonly known as: Zofran ODT  4 mg, Oral, EVERY 8 HOURS PRN             11/13/2020   Scooby Mercado PA-C Steele, Ryan, PA-C  11/13/20 2029

## 2020-11-13 NOTE — TELEPHONE ENCOUNTER
Hasn't been feeling well for past couple weeks.    4 a.m. today - nausea - no vomiting.  Bad stomach cramps and diarrhea - woke her from sleep.  Cramps so bad she's had to lay down. She lays in the bathtub.    10 or more since 4 a.m.    Sometimes there is a red tinge to her stools..    Urinated a small amount just recently.    I instructed ER.  She said she's been at [facility] in [place] twice recently and doesn't want to go there again. I told her it's her decision to decide where to go.  She said she'll go to [facility]. She'll have her mom drive her.    Reason for Disposition    SEVERE abdominal pain (e.g., excruciating) and present > 1 hour    Additional Information    Negative: Shock suspected (e.g., cold/pale/clammy skin, too weak to stand, low BP, rapid pulse)    Negative: Difficult to awaken or acting confused (e.g., disoriented, slurred speech)    Negative: Sounds like a life-threatening emergency to the triager    Protocols used: DIARRHEA-A-OH    Violeta RIVERA RN [facility]

## 2020-11-14 ENCOUNTER — TELEPHONE (OUTPATIENT)
Dept: EMERGENCY MEDICINE | Facility: CLINIC | Age: 40
End: 2020-11-14

## 2020-11-14 LAB
C COLI+JEJUNI+LARI FUSA STL QL NAA+PROBE: NOT DETECTED
C DIFF TOX B STL QL: NEGATIVE
EC STX1 GENE STL QL NAA+PROBE: ABNORMAL
EC STX2 GENE STL QL NAA+PROBE: NOT DETECTED
ENTERIC PATHOGEN COMMENT: ABNORMAL
NOROV GI+II ORF1-ORF2 JNC STL QL NAA+PR: NOT DETECTED
RVA NSP5 STL QL NAA+PROBE: NOT DETECTED
SALMONELLA SP RPOD STL QL NAA+PROBE: NOT DETECTED
SHIGELLA SP+EIEC IPAH STL QL NAA+PROBE: NOT DETECTED
SPECIMEN SOURCE: NORMAL
V CHOL+PARA RFBL+TRKH+TNAA STL QL NAA+PR: NOT DETECTED
Y ENTERO RECN STL QL NAA+PROBE: NOT DETECTED

## 2020-11-14 NOTE — TELEPHONE ENCOUNTER
[facility] Emergency Department Lab result notification [Adult-Female]     [place] ED lab result protocol used  Shiga toxin I and II protocol     Reason for call  Notify of lab results, assess symptoms,  review ED providers recommendations/discharge instructions (if necessary) and advise per ED lab result f/u protocol     Lab Result (including Rx patient on, if applicable)  Final Enteric Bacteria and Virus Panel by SHELDON Stool is POSITIVE for Shiga toxin 1 gene by SHELDON  Patient to be notified, symptom's assessed and advised per [place] ED lab result protocol.    RN Assessment (Patient s current Symptoms), include time called.  [Insert Left message here if message left]  4PM: Spoke with patient. She states she is feeling the same as when she was in the ED. Is having diarrhea stool with blood in it every 2-3 hours. C/O abdominal cramping prior to having a BM, but also with any movement. Denies fever, shortness of breath, vomiting. Gets nauseated when having a BM. Has been urinating every 2-3 hours. Feels weak.     RN Recommendations/Instructions per [place] ED lab result protocol  Patient notified of lab result and treatment recommendations.  RN reviewed information about Shiga toxin from protocol RN Action and RN reference guide.  Advised to return to ED if symptoms worsen.  Advised to increase fluids, monitor her urine output, do not take any antidiarrheal meds and to follow up with a PCP in 1-2 days.   The patient is comfortable with the information given and has no further questions.        Please Contact your PCP clinic or return to the Emergency department if your    Symptoms worsen or other concerning symptom's.    PCP follow-up Questions asked: YES       [RN Name]  Cheryl Carmichael RN  [facility] RN  Lung Nodule and ED Lab Result RN  Epic pool (ED late result f/u RN): [id number]  [facility] INCIDENTAL RADIOLOGY F/U NURSES: [id number]  # [phone]

## 2020-11-14 NOTE — ED NOTES
The patient with bloody stool and abdominal pain with history ovarian cyst was signed out to me by SAVANNAH Richey.  The patient is CT showed colitis I did visualize the report saying gallstones as well as kidney stones but these do not appear likely the cause.  There is some adenopathy.  The patient is currently comfortable I felt it is appropriate she follow-up her labs reviewed and she was discharged home in good condition.  Of note due to the colitis adenopathy I did write her for 5 tramadol I discussed what symptoms she return for.    Diagnosis: Colitis    Low up with PMD in 1 to 2 days.     Derrick Hernandez MD  11/13/20 2108

## 2020-11-14 NOTE — TELEPHONE ENCOUNTER
[facility] Emergency Department Lab result notification [Adult-Female]    [place] ED lab result protocol used  Shiga toxin I and II protocol    Reason for call  Notify of lab results, assess symptoms,  review ED providers recommendations/discharge instructions (if necessary) and advise per ED lab result f/u protocol    Lab Result (including Rx patient on, if applicable)  Final Enteric Bacteria and Virus Panel by SHELDON Stool is POSITIVE for Shiga toxin 1 gene by SHELDON  Patient to be notified, symptom's assessed and advised per [place] ED lab result protocol.  Information table from ED Provider visit on 11/13/2020  Symptoms reported at ED visit (Chief complaint, HPI) Diarrhea        HPI   Edel Kramer is a 40 year old female who presents to the ED for evaluation of diarrhea.  The patient reports onset of diarrhea that woke her up from sleep around 0400 today.  She states that during initial episode, she had to lay down the toilet she felt that she had a near syncopal episode.  She did not experience syncope or hit her head.  She states that since onset she has had approximately 10 episodes, however fortunately no episodes in the last hour.  She reports associated nausea without vomiting.  She does note cramping to her bilateral lower abdomen as well as some right-sided flank pain that began approximately 5 hours ago.  She does note a small amount of blood present in her diarrhea as well.  No fevers, chills, chest pain, dyspnea.  No urinary symptoms.  Patient states she did take tramadol for pain last night.  She is additionally on metronidazole vaginal gel for BV. Of note, patient was seen in the ED twice within the past 10 days for RLQ abdominal pain. See MDMs below.     MDM 11/10/20  40-year-old female presents with right-sided abdominal and pelvic pain moving to her back and shoulder. She was recently diagnosed with a 3 cm right ovarian cyst. Follow-up today with gynecology was reassuring however  her symptoms worsen while at work this evening. I suspect the cyst ruptured and is causing mild generalized peritoneal irritation. She was given IV fluids with pain control. Labs reveal a mild drop in her hemoglobin consistent with a hemorrhagic cyst. She has normal vital signs and no findings suggestive of active bleeding. Ultrasound reveals the cyst has decreased to 1.5 cm without significant free fluid. The patient is stable for discharge. We discussed follow-up if not improved over the next 2 to 3 days. Gynecology recommended interval ultrasound in approximately 6 weeks. She will be prescribed limited number of tramadol for breakthrough pain and continue using NSAIDs and Zofran as needed.     MDM 11/3/20  I accepted this patient in signout from Dr. Sanchez. In brief, the patient is a 40-year-old female who presented for evaluation of right-sided abdominal pain. At the time of signout, the patient was awaiting results of a CT of her abdomen and pelvis with a plan to potentially obtain a pelvic ultrasound. CT imaging was obtained which does reveal a right-sided ovarian cyst. Ultrasound was subsequently obtained to further evaluate this and to exclude torsion. Normal blood flow was noted to the ovary. On reevaluation, the patient continues to have pain. She reports that she was having this pain during the ultrasound. We discussed options for management. I did offer continued emergency department observation and IV pain control with reevaluation versus oral pain control and discharged home. Patient would prefer to try to go home. We discussed the possibility of intermittent torsion although I think this is less likely given that she did have pain during her ultrasound. She agrees to return if symptoms worsen. Otherwise, I recommended close outpatient follow-up with her OB/GYN. Patient expressed comfort with this plan. She appears nontoxic at the time of discharge.   Significant Medical hx, if applicable (i.e. CKD,  diabetes) None   Allergies Allergies   Allergen Reactions     Droperidol      Feels like I can't move my body     Benzene Rash      Weight, if applicable Wt Readings from Last 2 Encounters:   11/13/20 70.3 kg (155 lb)   01/25/18 63.5 kg (140 lb)      Coumadin/Warfarin [Yes /No] No   Creatinine Level (mg/dl) Creatinine   Date Value Ref Range Status   11/13/2020 0.81 0.52 - 1.04 mg/dL Final      Creatinine clearance (ml/min), if applicable Creatinine clearance cannot be calculated (Unknown ideal weight.)   Pregnant (Yes/No/NA) No   Breastfeeding (Yes/No/NA) ?   ED providers Impression and Plan (applicable information) Edel Kramer is a 40 year old female who presents to the ED for evaluation of diarrhea and abdominal cramping.  Patient has had ongoing right-sided abdominal pain for the past 2 weeks.  She has had two work-ups in the emergency departments since onset with CT of abdomen revealing a right-sided ovarian cyst, likely ruptured, as cause of her pain. Today she developed profuse diarrhea and noted blood in the diarrhea this afternoon, prompting her presentation to the ED. She also noted an episode of R flank pain. See HPI as above for additional details. Vitals and physical exam as above. DDx for RLQ/R flank pain was broad and included mesenteric ischemia, diverticulitis, perforated viscous, pyelonephritis, stone, appendicitis, ovarian torsion, ovarian cyst, among others. Given known cyst, concern for ovarian torsion, so US obtained without evidence for nefarious etiology. DDx of patient's hematochezia included ishemic colitis, infectious colitis, diverticulosis, among others. Suspect infectious colitis at this time given associated diarrhea. Patient does have known history of diverticula on CT obtained 10 days ago at [place]. On reevaluation, patient notes multiple additional episodes of hematochezia and worsening abdominal pain, so CT of Abd/pelvis added on. This is pending at time of this  dictation. Patient signed out to Dr. Hernandez for ultimate disposition pending CT. With negative CT, plan for discharge to home with conservative management, including Tylenol, pushing fluids with electrolytes. Close follow up with PCP will be indicated with persistent diarrhea. Stool studies pending at this time.    ED diagnosis 1. Diarrhea  R19.7     2. Hematochezia  K92.1     3. Abdominal pain, generalized         ED provider Scooby Mercado PA-C      RN Assessment (Patient s current Symptoms), include time called.  [Insert Left message here if message left]  10:18AM: Left voicemail message requesting a call back to [facility] ED Lab Result RN at [phone].  RN is available every day between 10 a.m. and 6:30 p.m..    [RN Name]  Cheryl Carmichael RN  [facility] RN  Lung Nodule and ED Lab Result RN  Epic pool (ED late result f/u RN): [id number]  [facility] INCIDENTAL RADIOLOGY F/U NURSES: [id number]  Ph# [phone]    Copy of Lab result   Enteric Bacteria and Virus Panel by SHELDON Stool  Order: [id number]  Status:  Final result   Visible to patient:  No (inaccessible in MyChart) Dx:  Diarrhea  Specimen Information: Feces          Ref Range & Units 1d ago    Campylobacter group by SHELDON NDET^Not Detected Not Detected     Salmonella species by SHELDON NDET^Not Detected Not Detected     Shigella species by SHELDON NDET^Not Detected Not Detected     Vibrio group by SHELDON NDET^Not Detected Not Detected     Rotavirus A by SHELDON NDET^Not Detected Not Detected     Shiga toxin 1 gene by SHELDON NDET^Not Detected Detected, Abnormal ResultAbnormal     Comment: Called to   LEFT MESSAGE FOR [facility] LAB FOLLOW UP RN AT 0620 11.14.20 CF     Shiga toxin 2 gene by SHELDON NDET^Not Detected Not Detected     Norovirus I and II by SHELDON NDET^Not Detected Not Detected     Yersinia enterocolitica by SHELDON NDET^Not Detected Not Detected     Enteric pathogen comment  Testing performed by multiplexed, qualitative PCR using the [employer] Enteric    Pathogens Nucleic Acid Test. Results should not be used as the sole basis for diagnosis,   treatment, or other patient management decisions.    Comment: Positive results do not rule out co-infection with other organisms that are   not detected by this test, and may not be the sole or definitive cause of   patient illness.   Negative results in the setting of clinical illness compatible with   gastroenteritis may be due to infection by pathogens that are not detected by   this test or non-infectious causes such as ulcerative colitis, irritable bowel    syndrome, or Crohn's disease.   Note: Shiga toxin producing E. coli (STEC) typically harbor one or both genes   that encode for Shiga toxins 1 and 2.    Resulting Agency  [id number]         Specimen Collected: 11/13/20  7:21 PM Last Resulted: 11/14/20  6:24 AM

## 2022-10-13 NOTE — DISCHARGE INSTRUCTIONS
Animal Bite (General)  An animal bite can cause a wound deep enough to break the skin. In such cases, the wound is cleaned and then sometimes closed. If the wound is closed, it may not be closed completely. This is so that fluid can drain if the wound becomes infected. In addition to wound care, a tetanus shot may be given, if needed.    Home care    Care for the wound as directed. If a dressing was applied to the wound, be sure to change it as directed.    Wash your hands well with soap and warm water before and after caring for the wound. This helps lower the risk of infection.    If the wound bleeds, place a clean, soft cloth on the wound. Then firmly apply pressure until the bleeding stops. This may take up to 5 minutes. Do not release the pressure and look at the wound during this time.    Most skin wounds heal within 10 days. But an infection can occur even with proper treatment. So be sure to watch the wound for signs of infection (see below). Check the wound as often as directed by your healthcare provider.    Antibiotics may be prescribed. These help prevent or treat infection. If you re given antibiotics, take them as directed. Also be sure to complete the medicines.  Rabies prevention  Rabies is a virus that can be carried in certain animals. These can include domestic animals such as dogs and cats. Wild animals such as skunks, raccoons, foxes, and bats can also carry rabies. Pets fully vaccinated against rabies (2 shots) are at very low risk of infection. But because human rabies is almost always fatal, any biting pet should be confined for 10 days as an extra precaution. In general, if there is a risk for rabies, the following steps may need to be taken:    If someone s pet dog or cat has bitten you, it should be kept in a secure area for the next 10 days to watch for signs of illness. (If the pet owner won t allow this, contact your local animal control center.) If the dog or cat becomes ill or dies  Cleared consult only.  Please see previous LC's chart note for initial lactation visit findings and POC.   during that time, contact your local animal control center at once so the animal may be tested for rabies. If the pet stays healthy for the next 10 days, there is no danger of rabies in the animal or you.    If a stray pet bit you, contact your local animal control center. They can give information on capture, quarantine, and animal rabies testing.    If you can t find the animal that bit you in the next 2 days, and if rabies exists in your region, you may need to receive the rabies vaccine series. Call your healthcare provider right away. Or return to the emergency department promptly.    All animal bites should be reported to the local animal control center. If you were not given a form to fill out, you can report this yourself.  Follow-up care  Follow up with your healthcare provider, or as directed.  When to seek medical advice  Call your healthcare provider right away if any of these occur:    Signs of infection:    Spreading redness or warmth from the wound    Increased pain or swelling    Fever of 100.4 F (38 C) or higher, or as directed by your healthcare provider    Colored fluid or pus draining from the wound    Signs of rabies infection:    Headache    Confusion    Strange behavior    Increased salivating and drooling    Seizure    Decreased ability to move any body part near the bite area    Bleeding that can't be stopped after 5 minutes of firm pressure  Date Last Reviewed: 3/1/2017    4986-1491 [employer]. [address] [place] [address], [place] [address]. All rights reserved. This information is not intended as a substitute for professional medical care. Always follow your healthcare professional's instructions.

## 2023-01-25 ENCOUNTER — APPOINTMENT (OUTPATIENT)
Dept: CT IMAGING | Facility: CLINIC | Age: 43
End: 2023-01-25
Attending: EMERGENCY MEDICINE
Payer: COMMERCIAL

## 2023-01-25 ENCOUNTER — APPOINTMENT (OUTPATIENT)
Dept: ULTRASOUND IMAGING | Facility: CLINIC | Age: 43
End: 2023-01-25
Attending: EMERGENCY MEDICINE
Payer: COMMERCIAL

## 2023-01-25 ENCOUNTER — HOSPITAL ENCOUNTER (EMERGENCY)
Facility: CLINIC | Age: 43
Discharge: HOME | End: 2023-01-25
Attending: EMERGENCY MEDICINE | Admitting: EMERGENCY MEDICINE
Payer: COMMERCIAL

## 2023-01-25 VITALS
TEMPERATURE: 97.9 F | SYSTOLIC BLOOD PRESSURE: 123 MMHG | DIASTOLIC BLOOD PRESSURE: 79 MMHG | OXYGEN SATURATION: 99 % | RESPIRATION RATE: 18 BRPM | HEART RATE: 72 BPM

## 2023-01-25 DIAGNOSIS — R10.31 ABDOMINAL PAIN, RIGHT LOWER QUADRANT: ICD-10-CM

## 2023-01-25 DIAGNOSIS — N92.6 IRREGULAR MENSTRUAL CYCLE: ICD-10-CM

## 2023-01-25 DIAGNOSIS — R11.0 NAUSEA: ICD-10-CM

## 2023-01-25 LAB
ALBUMIN SERPL BCG-MCNC: 4.7 G/DL (ref 3.5–5.2)
ALBUMIN UR-MCNC: 20 MG/DL
ALP SERPL-CCNC: 100 U/L (ref 35–104)
ALT SERPL W P-5'-P-CCNC: 21 U/L (ref 10–35)
ANION GAP SERPL CALCULATED.3IONS-SCNC: 14 MMOL/L (ref 7–15)
APPEARANCE UR: ABNORMAL
AST SERPL W P-5'-P-CCNC: 21 U/L (ref 10–35)
BASOPHILS # BLD AUTO: 0 10E3/UL (ref 0–0.2)
BASOPHILS NFR BLD AUTO: 1 %
BILIRUB SERPL-MCNC: 0.2 MG/DL
BILIRUB UR QL STRIP: NEGATIVE
BUN SERPL-MCNC: 14.5 MG/DL (ref 6–20)
CALCIUM SERPL-MCNC: 8.7 MG/DL (ref 8.6–10)
CHLORIDE SERPL-SCNC: 103 MMOL/L (ref 98–107)
COLOR UR AUTO: ABNORMAL
CREAT SERPL-MCNC: 0.95 MG/DL (ref 0.51–0.95)
DEPRECATED HCO3 PLAS-SCNC: 22 MMOL/L (ref 22–29)
EOSINOPHIL # BLD AUTO: 0.1 10E3/UL (ref 0–0.7)
EOSINOPHIL NFR BLD AUTO: 1 %
ERYTHROCYTE [DISTWIDTH] IN BLOOD BY AUTOMATED COUNT: 17 % (ref 10–15)
FERRITIN SERPL-MCNC: 8 NG/ML (ref 6–175)
GFR SERPL CREATININE-BSD FRML MDRD: 77 ML/MIN/1.73M2
GLUCOSE SERPL-MCNC: 98 MG/DL (ref 70–99)
GLUCOSE UR STRIP-MCNC: NEGATIVE MG/DL
HCG UR QL: NEGATIVE
HCT VFR BLD AUTO: 40.1 % (ref 35–47)
HGB BLD-MCNC: 12.5 G/DL (ref 11.7–15.7)
HGB UR QL STRIP: ABNORMAL
IMM GRANULOCYTES # BLD: 0 10E3/UL
IMM GRANULOCYTES NFR BLD: 0 %
IRON BINDING CAPACITY (ROCHE): 485 UG/DL (ref 240–430)
IRON SATN MFR SERPL: 9 % (ref 15–46)
IRON SERPL-MCNC: 43 UG/DL (ref 37–145)
KETONES UR STRIP-MCNC: NEGATIVE MG/DL
LEUKOCYTE ESTERASE UR QL STRIP: ABNORMAL
LIPASE SERPL-CCNC: 31 U/L (ref 13–60)
LYMPHOCYTES # BLD AUTO: 2.5 10E3/UL (ref 0.8–5.3)
LYMPHOCYTES NFR BLD AUTO: 34 %
MCH RBC QN AUTO: 26.2 PG (ref 26.5–33)
MCHC RBC AUTO-ENTMCNC: 31.2 G/DL (ref 31.5–36.5)
MCV RBC AUTO: 84 FL (ref 78–100)
MONOCYTES # BLD AUTO: 0.5 10E3/UL (ref 0–1.3)
MONOCYTES NFR BLD AUTO: 7 %
MUCOUS THREADS #/AREA URNS LPF: PRESENT /LPF
NEUTROPHILS # BLD AUTO: 4.4 10E3/UL (ref 1.6–8.3)
NEUTROPHILS NFR BLD AUTO: 57 %
NITRATE UR QL: NEGATIVE
NRBC # BLD AUTO: 0 10E3/UL
NRBC BLD AUTO-RTO: 0 /100
PH UR STRIP: 6.5 [PH] (ref 5–7)
PLATELET # BLD AUTO: 433 10E3/UL (ref 150–450)
POTASSIUM SERPL-SCNC: 3.6 MMOL/L (ref 3.4–5.3)
PROT SERPL-MCNC: 8.2 G/DL (ref 6.4–8.3)
RBC # BLD AUTO: 4.78 10E6/UL (ref 3.8–5.2)
RBC URINE: >182 /HPF
SODIUM SERPL-SCNC: 139 MMOL/L (ref 136–145)
SP GR UR STRIP: 1.02 (ref 1–1.03)
SQUAMOUS EPITHELIAL: 4 /HPF
TSH SERPL DL<=0.005 MIU/L-ACNC: 1.85 UIU/ML (ref 0.3–4.2)
UROBILINOGEN UR STRIP-MCNC: NORMAL MG/DL
WBC # BLD AUTO: 7.5 10E3/UL (ref 4–11)
WBC URINE: 21 /HPF

## 2023-01-25 PROCEDURE — 81001 URINALYSIS AUTO W/SCOPE: CPT | Performed by: EMERGENCY MEDICINE

## 2023-01-25 PROCEDURE — 83550 IRON BINDING TEST: CPT | Performed by: EMERGENCY MEDICINE

## 2023-01-25 PROCEDURE — 85025 COMPLETE CBC W/AUTO DIFF WBC: CPT | Performed by: EMERGENCY MEDICINE

## 2023-01-25 PROCEDURE — 36415 COLL VENOUS BLD VENIPUNCTURE: CPT | Performed by: EMERGENCY MEDICINE

## 2023-01-25 PROCEDURE — 87086 URINE CULTURE/COLONY COUNT: CPT | Performed by: EMERGENCY MEDICINE

## 2023-01-25 PROCEDURE — 99285 EMERGENCY DEPT VISIT HI MDM: CPT | Mod: 25

## 2023-01-25 PROCEDURE — 76705 ECHO EXAM OF ABDOMEN: CPT

## 2023-01-25 PROCEDURE — 250N000011 HC RX IP 250 OP 636: Performed by: EMERGENCY MEDICINE

## 2023-01-25 PROCEDURE — 81025 URINE PREGNANCY TEST: CPT | Performed by: EMERGENCY MEDICINE

## 2023-01-25 PROCEDURE — 83690 ASSAY OF LIPASE: CPT | Performed by: EMERGENCY MEDICINE

## 2023-01-25 PROCEDURE — 82728 ASSAY OF FERRITIN: CPT | Performed by: EMERGENCY MEDICINE

## 2023-01-25 PROCEDURE — 84443 ASSAY THYROID STIM HORMONE: CPT | Performed by: EMERGENCY MEDICINE

## 2023-01-25 PROCEDURE — 80053 COMPREHEN METABOLIC PANEL: CPT | Performed by: EMERGENCY MEDICINE

## 2023-01-25 PROCEDURE — 96374 THER/PROPH/DIAG INJ IV PUSH: CPT | Mod: 59

## 2023-01-25 PROCEDURE — 74177 CT ABD & PELVIS W/CONTRAST: CPT

## 2023-01-25 RX ORDER — ONDANSETRON 4 MG/1
4 TABLET, ORALLY DISINTEGRATING ORAL EVERY 6 HOURS PRN
Qty: 15 TABLET | Refills: 0 | Status: SHIPPED | OUTPATIENT
Start: 2023-01-25

## 2023-01-25 RX ORDER — IOPAMIDOL 755 MG/ML
500 INJECTION, SOLUTION INTRAVASCULAR ONCE
Status: COMPLETED | OUTPATIENT
Start: 2023-01-25 | End: 2023-01-25

## 2023-01-25 RX ORDER — ONDANSETRON 2 MG/ML
4 INJECTION INTRAMUSCULAR; INTRAVENOUS ONCE
Status: COMPLETED | OUTPATIENT
Start: 2023-01-25 | End: 2023-01-25

## 2023-01-25 RX ADMIN — IOPAMIDOL 78 ML: 755 INJECTION, SOLUTION INTRAVENOUS at 16:51

## 2023-01-25 RX ADMIN — ONDANSETRON 4 MG: 2 INJECTION INTRAMUSCULAR; INTRAVENOUS at 16:16

## 2023-01-25 ASSESSMENT — ENCOUNTER SYMPTOMS
FEVER: 0
ABDOMINAL DISTENTION: 1
DIZZINESS: 1
DIARRHEA: 1
FREQUENCY: 0
FLANK PAIN: 0
DYSURIA: 0
ABDOMINAL PAIN: 1
NAUSEA: 1
CHILLS: 1
COUGH: 0
FATIGUE: 1

## 2023-01-25 ASSESSMENT — ACTIVITIES OF DAILY LIVING (ADL): ADLS_ACUITY_SCORE: 35

## 2023-01-25 NOTE — ED NOTES
PIT/Triage Evaluation    Patient presented with nausea and right-sided abdominal pain.  Patient was sent in by her OB/GYN for evaluation for cholecystitis/appendicitis.  Patient states that she has had 3 weeks of prolonged period that she describes as small bouts of bleeding.  Patient went to her OB/GYN today for evaluation, but due to her complaints, patient was sent down to the ER for further evaluation.  Patient complains of right-sided abdominal pain that is worse in right upper quadrant below her ribs, but does note radiation into her right-sided abdomen.  Patient also has a history of prolactinoma for which she provided a list of lab work that her OB/GYN and there is requesting to be done.  Patient also is concerned that she is anemic.  Patient complains of nausea, but no other associated symptoms.  Patient denies any exacerbating or relieving factors.  Patient describes her pain is intermittent in nature.    Exam is notable for:  Constitutional:       General: Not in acute distress.        Appearance: Normal appearance.   HENT:      Head: Normocephalic and atraumatic.   Eyes:      Extraocular Movements: Extraocular movements intact.      Conjunctiva/sclera: Conjunctivae normal.   Cardiovascular:      Rate and Rhythm: Normal rate and regular rhythm.   Pulmonary:      Effort: Pulmonary effort is normal. No respiratory distress.      Breath sounds: Normal breath sounds.   Abdominal:      General: Abdomen is flat. There is no distension.      Palpations: Abdomen is soft.      Tenderness: There is mild right upper quadrant abdominal tenderness.   Musculoskeletal:      Cervical back: Normal range of motion. No rigidity.      Right lower leg: No edema.      Left lower leg: No edema.   Skin:     General: Skin is warm and dry.   Neurological:      General: No focal deficit present.      Mental Status: Alert and oriented to person, place, and time.   Psychiatric:         Mood and Affect: Mood normal.         Behavior:  Behavior normal.    Appropriate interventions for symptom management were initiated if applicable.  Appropriate diagnostic tests were initiated if indicated.    Important information for subsequent clinician:  42-year-old female as described above presents to the emergency department with nausea and right-sided abdominal pain in addition to 3-week history of prolonged period.  Patient hemodynamically stable at time evaluation.  Afebrile.  Complains of nausea.  Mild right upper quadrant abdominal pain with radiation down right side abdomen.  Differential diagnosis considered includes, but not limited to acute cholecystitis, pancreatitis, nephrolithiasis/renal colic, colitis, appendicitis, and bowel obstruction.  Right upper quadrant ultrasound ordered for evaluation for cholecystitis/cholelithiasis due to higher sensitivity and CT and pelvis with contrast ordered for evaluation of the edition above discussed etiologies.  Additional blood work including prolactin, iron/TIBC, and ferritin ordered per the request of patient's OB/GYN in light of history of prolactinoma as well.  Discussed care plan with patient who voiced understanding and agreement with plan.  Answered all questions.  Additional work-up and orders as listed in chart.    I briefly evaluated the patient and developed an initial plan of care. I discussed this plan and explained that this brief interaction does not constitute a full evaluation. Patient/family understands that they should wait to be fully evaluated and discuss any test results with another clinician prior to leaving the hospital.     Vidal Menjivar DO  01/25/23 1298

## 2023-01-25 NOTE — ED TRIAGE NOTES
Has had her period for 3 weeks. Hx of a benign pituitary tumor. Feeling nauseas and weak. Decreased appetite. Nausea started 2 days ago, worse today. Denies vomiting or diarrhea.

## 2023-01-25 NOTE — LETTER
January 25, 2023      To Whom It May Concern:      Edel Kramer was seen in our Emergency Department today, 01/25/23.  Please excuse her from work today. She may return to work tomorrow.    Sincerely,        Sobia Paredes MD

## 2023-01-25 NOTE — ED PROVIDER NOTES
History   Chief Complaint:  Nausea    The history is provided by the patient.      Edel Kramer is a 42 year old female with a history of dysmenorrhea, anterior pituitary hyperfunction who presents with nausea and dizziness. Edel explains that her current menses has persistent for the past three weeks. She reports nausea, dizziness, chills, and fatigue onset two days ago. She was seen by her primary Ob/Gyn earlier today and was recommended emergency department work up for cholecystitis/appendicitis as she had mild palpable abdominal pain to the right upper and lower quadrant on palpation. Edel additionally reports minimal diarrhea and abdominal distention. Denies pain with or after eating. She feels she has more right-sided abdominal pain with her menses. Denies fever, cough, dysuria, urinary frequency, and flank pain.     Independent Historian:   None.       ROS:  Review of Systems   Constitutional: Positive for chills and fatigue. Negative for fever.   Respiratory: Negative for cough.    Gastrointestinal: Positive for abdominal distention, abdominal pain, diarrhea and nausea.   Genitourinary: Positive for vaginal bleeding. Negative for dysuria, flank pain and frequency.   Neurological: Positive for dizziness.   All other systems reviewed and are negative.    Allergies:  Droperidol  Benzene     Medications:    Adderall   Cabergoline   Topiramate   Rizatriptan   Sertraline   Zolmitriptan   Diclofenac   Cyclobenzaprine     Past Medical History:    Anxiety   Depressive disorder  Migraine   Substance abuse   ADHD  Borderline personality disorder   Dysmenorrhea   Genital herpes   Benign neoplasm of pituitary gland and craniopharyngeal duct   Anterior pituitary hyperfunction   Eating disorder   Hypothyroidism   IBS     Past Surgical History:    Liposuction   Neuroplasty/ transposition medial nerve at carpal tunnel bilateral     Family History:    Mother- autism, psychiatric illness  Father- prostate  cancer, psychiatric illness   Sister- MARIANO disease     Social History:  She reports that she has quit smoking. She does not have any smokeless tobacco history on file. She reports current alcohol use. She reports that she does not use drugs.    Physical Exam     Patient Vitals for the past 24 hrs:   BP Temp Pulse Resp SpO2   01/25/23 1818 -- -- -- 18 --   01/25/23 1800 123/79 -- 72 -- --   01/25/23 1754 -- -- -- -- 99 %   01/25/23 1233 (!) 136/99 97.9  F (36.6  C) 95 20 98 %     Physical Exam  Nursing note and vitals reviewed.  Constitutional:  Appears well-developed and well-nourished.   HENT:   Head:    Atraumatic.   Mouth/Throat:   Oropharynx is clear and moist. No oropharyngeal exudate.   Eyes:    Pupils are equal, round, and reactive to light.   Neck:    Normal range of motion. Neck supple.      No tracheal deviation present. No thyromegaly present.   Cardiovascular:  Normal rate, regular rhythm, no murmur   Pulmonary/Chest: Breath sounds are clear and equal without wheezes or crackles.  Abdominal:   Soft. Bowel sounds are normal. Exhibits no distension and      no mass. Minimal RLQ tenderness without rebound or guarding.   Musculoskeletal:  Exhibits no edema.   Lymphadenopathy:  No cervical adenopathy.   Neurological:   Alert and oriented to person, place, and time.   Skin:    Skin is warm and dry. No rash noted. No pallor.     Emergency Department Course     Imaging:  CT Abdomen Pelvis w Contrast   Final Result   IMPRESSION:       1.  Mild rectosigmoid colonic wall hyperenhancement could be inflammatory if appropriate symptoms.      2.  Remaining bowel unremarkable. No appendicitis.      3.  Nonobstructing bilateral renal stones, as above.      4.  Cholelithiasis. No CT findings for acute cholecystitis. No biliary dilatation.      5.  Likely incidental 2.7 x 2.8 x 3.7 cm left ovarian cyst.            US Abdomen Limited (RUQ)   Final Result   IMPRESSION:      1.  Cholelithiasis, without evidence of acute  cholecystitis.      2.  No biliary ductal dilation.         Report per radiology    Laboratory:  Labs Ordered and Resulted from Time of ED Arrival to Time of ED Departure   ROUTINE UA WITH MICROSCOPIC REFLEX TO CULTURE - Abnormal       Result Value    Color Urine Orange (*)     Appearance Urine Slightly Cloudy (*)     Glucose Urine Negative      Bilirubin Urine Negative      Ketones Urine Negative      Specific Gravity Urine 1.020      Blood Urine Large (*)     pH Urine 6.5      Protein Albumin Urine 20 (*)     Urobilinogen Urine Normal      Nitrite Urine Negative      Leukocyte Esterase Urine Small (*)     Mucus Urine Present (*)     RBC Urine >182 (*)     WBC Urine 21 (*)     Squamous Epithelials Urine 4 (*)    IRON AND IRON BINDING CAPACITY - Abnormal    Iron 43      Iron Binding Capacity 485 (*)     Iron Sat Index 9 (*)    CBC WITH PLATELETS AND DIFFERENTIAL - Abnormal    WBC Count 7.5      RBC Count 4.78      Hemoglobin 12.5      Hematocrit 40.1      MCV 84      MCH 26.2 (*)     MCHC 31.2 (*)     RDW 17.0 (*)     Platelet Count 433      % Neutrophils 57      % Lymphocytes 34      % Monocytes 7      % Eosinophils 1      % Basophils 1      % Immature Granulocytes 0      NRBCs per 100 WBC 0      Absolute Neutrophils 4.4      Absolute Lymphocytes 2.5      Absolute Monocytes 0.5      Absolute Eosinophils 0.1      Absolute Basophils 0.0      Absolute Immature Granulocytes 0.0      Absolute NRBCs 0.0     HCG QUALITATIVE URINE - Normal    hCG Urine Qualitative Negative     COMPREHENSIVE METABOLIC PANEL - Normal    Sodium 139      Potassium 3.6      Chloride 103      Carbon Dioxide (CO2) 22      Anion Gap 14      Urea Nitrogen 14.5      Creatinine 0.95      Calcium 8.7      Glucose 98      Alkaline Phosphatase 100      AST 21      ALT 21      Protein Total 8.2      Albumin 4.7      Bilirubin Total 0.2      GFR Estimate 77     TSH WITH FREE T4 REFLEX - Normal    TSH 1.85     LIPASE - Normal    Lipase 31     FERRITIN    PROLACTIN, PITUITARY MACROADENOMA   URINE CULTURE     Emergency Department Course & Assessments:    Interventions:  Medications   ondansetron (ZOFRAN) injection 4 mg (4 mg Intravenous Given 1/25/23 1616)   sodium chloride (PF) 0.9% PF flush 100 mL (59 mLs Intravenous Given 1/25/23 1652)   iopamidol (ISOVUE-370) solution 500 mL (78 mLs Intravenous Given 1/25/23 1651)       Assessments:  1755 I obtained history and examined the patient as noted above. I explained findings and discussed discharge with the patient. All questions answered.     Disposition:  The patient was discharged to home.     Impression & Plan      Medical Decision Making:  This patient's primary complaint is irregular menstruation with having a very long menstrual period lasting for the past 3 weeks with associated nonspecific symptoms of fatigue and nausea for the past 2 days.  She was sent here by her OB/GYN physician who she saw earlier today to rule out cholecystitis or appendicitis due to right sided abdominal discomfort.  CT scan of her abdomen pelvis did not show any sign of appendicitis and ultrasound imaging did not show any sign of cholecystitis.  I feel that the hyperenhancement of her rectum and sigmoid is likely due to artifact since she does not have any diarrhea and her abdominal discomfort is on the right right upper quadrant and right lower quadrant and she does not have any problems with bowel movements or bloody stools.  Radiologist did not have concern for diverticulitis.  Her urine appears contaminated and with blood due to her menstrual period I do not feel there was sign of UTI since she does not have any UTI symptoms clinically however her urine was cultured and is pending.  There was no sign of blood loss anemia or sepsis.  There was no electrolyte abnormality.  Feel that she can be safely discharged home and she was prescribed Zofran for nausea.  She was told to follow-up with her OB/GYN doctor and primary care physician  this week for follow-up.  She was told to return the emergency department if her symptoms worsen.  I also called her and left a voicemail message on her cell phone to have her follow-up with gastroenterology to have sigmoidoscopy to check this area to ensure it was an incidental finding.    Diagnosis:    ICD-10-CM    1. Nausea  R11.0       2. Abdominal pain, right lower quadrant  R10.31       3. Irregular menstrual cycle  N92.6         Discharge Medications:  Discharge Medication List as of 1/25/2023  6:13 PM      START taking these medications    Details   ondansetron (ZOFRAN ODT) 4 MG ODT tab Take 1 tablet (4 mg) by mouth every 6 hours as needed for nausea or vomiting, Disp-15 tablet, R-0, E-Prescribe           Scribe Disclosure:  I, Brooklyn Garcia, am serving as a scribe at 6:17 PM on 1/25/2023 to document services personally performed by Sobia Paredes MD based on my observations and the provider's statements to me.     1/25/2023   Sobia Paredes MD [place], Cheri Lee, MD  01/25/23 2039

## 2023-01-27 LAB — BACTERIA UR CULT: NO GROWTH

## 2023-04-01 ENCOUNTER — HEALTH MAINTENANCE LETTER (OUTPATIENT)
Age: 43
End: 2023-04-01

## 2023-06-17 ENCOUNTER — HEALTH MAINTENANCE LETTER (OUTPATIENT)
Age: 43
End: 2023-06-17

## 2024-03-23 ENCOUNTER — HEALTH MAINTENANCE LETTER (OUTPATIENT)
Age: 44
End: 2024-03-23

## 2024-08-10 ENCOUNTER — HEALTH MAINTENANCE LETTER (OUTPATIENT)
Age: 44
End: 2024-08-10

## 2025-08-16 ENCOUNTER — HEALTH MAINTENANCE LETTER (OUTPATIENT)
Age: 45
End: 2025-08-16